# Patient Record
Sex: FEMALE | Race: ASIAN | NOT HISPANIC OR LATINO
[De-identification: names, ages, dates, MRNs, and addresses within clinical notes are randomized per-mention and may not be internally consistent; named-entity substitution may affect disease eponyms.]

---

## 2022-09-29 PROBLEM — Z00.00 ENCOUNTER FOR PREVENTIVE HEALTH EXAMINATION: Status: ACTIVE | Noted: 2022-09-29

## 2022-10-06 ENCOUNTER — APPOINTMENT (OUTPATIENT)
Dept: SURGERY | Facility: CLINIC | Age: 62
End: 2022-10-06

## 2022-10-06 VITALS
DIASTOLIC BLOOD PRESSURE: 79 MMHG | BODY MASS INDEX: 26.13 KG/M2 | WEIGHT: 142 LBS | SYSTOLIC BLOOD PRESSURE: 128 MMHG | HEIGHT: 62 IN | HEART RATE: 86 BPM

## 2022-10-06 DIAGNOSIS — Z86.39 PERSONAL HISTORY OF OTHER ENDOCRINE, NUTRITIONAL AND METABOLIC DISEASE: ICD-10-CM

## 2022-10-06 DIAGNOSIS — I25.2 OLD MYOCARDIAL INFARCTION: ICD-10-CM

## 2022-10-06 DIAGNOSIS — Z82.49 FAMILY HISTORY OF ISCHEMIC HEART DISEASE AND OTHER DISEASES OF THE CIRCULATORY SYSTEM: ICD-10-CM

## 2022-10-06 DIAGNOSIS — Z83.3 FAMILY HISTORY OF DIABETES MELLITUS: ICD-10-CM

## 2022-10-06 DIAGNOSIS — Z86.73 PERSONAL HISTORY OF TRANSIENT ISCHEMIC ATTACK (TIA), AND CEREBRAL INFARCTION W/OUT RESIDUAL DEFICITS: ICD-10-CM

## 2022-10-06 DIAGNOSIS — M51.26 OTHER INTERVERTEBRAL DISC DISPLACEMENT, LUMBAR REGION: ICD-10-CM

## 2022-10-06 DIAGNOSIS — G58.9 MONONEUROPATHY, UNSPECIFIED: ICD-10-CM

## 2022-10-06 DIAGNOSIS — Z95.0 PRESENCE OF CARDIAC PACEMAKER: ICD-10-CM

## 2022-10-06 DIAGNOSIS — Z86.79 PERSONAL HISTORY OF OTHER DISEASES OF THE CIRCULATORY SYSTEM: ICD-10-CM

## 2022-10-06 DIAGNOSIS — E11.9 TYPE 2 DIABETES MELLITUS W/OUT COMPLICATIONS: ICD-10-CM

## 2022-10-06 DIAGNOSIS — Z78.9 OTHER SPECIFIED HEALTH STATUS: ICD-10-CM

## 2022-10-06 DIAGNOSIS — Z87.2 PERSONAL HISTORY OF DISEASES OF THE SKIN AND SUBCUTANEOUS TISSUE: ICD-10-CM

## 2022-10-06 DIAGNOSIS — I10 ESSENTIAL (PRIMARY) HYPERTENSION: ICD-10-CM

## 2022-10-06 PROCEDURE — 99203 OFFICE O/P NEW LOW 30 MIN: CPT

## 2022-10-06 RX ORDER — EXENATIDE 2 MG/.65ML
INJECTION, SUSPENSION, EXTENDED RELEASE SUBCUTANEOUS
Refills: 0 | Status: ACTIVE | COMMUNITY

## 2022-10-06 RX ORDER — ACETAMINOPHEN 325 MG/1
325 TABLET, CHEWABLE ORAL
Refills: 0 | Status: ACTIVE | COMMUNITY

## 2022-10-06 RX ORDER — EZETIMIBE 10 MG/1
10 TABLET ORAL
Refills: 0 | Status: ACTIVE | COMMUNITY

## 2022-10-06 RX ORDER — EVOLOCUMAB 140 MG/ML
INJECTION, SOLUTION SUBCUTANEOUS
Refills: 0 | Status: ACTIVE | COMMUNITY

## 2022-10-06 RX ORDER — RANOLAZINE 500 MG/1
500 TABLET, EXTENDED RELEASE ORAL
Refills: 0 | Status: ACTIVE | COMMUNITY

## 2022-10-06 RX ORDER — ERGOCALCIFEROL (VITAMIN D2) 1250 MCG
50000 CAPSULE ORAL
Refills: 0 | Status: ACTIVE | COMMUNITY

## 2022-10-06 RX ORDER — AMLODIPINE BESYLATE 2.5 MG/1
2.5 TABLET ORAL
Refills: 0 | Status: ACTIVE | COMMUNITY

## 2022-10-06 RX ORDER — CARVEDILOL 3.12 MG/1
3.12 TABLET, FILM COATED ORAL
Refills: 0 | Status: ACTIVE | COMMUNITY

## 2022-10-06 RX ORDER — TICAGRELOR 90 MG/1
90 TABLET ORAL
Refills: 0 | Status: ACTIVE | COMMUNITY

## 2022-10-06 RX ORDER — INSULIN GLARGINE 100 [IU]/ML
INJECTION, SOLUTION SUBCUTANEOUS
Refills: 0 | Status: ACTIVE | COMMUNITY

## 2022-10-06 RX ORDER — ISOSORBIDE MONONITRATE 60 MG/1
60 TABLET, EXTENDED RELEASE ORAL
Refills: 0 | Status: ACTIVE | COMMUNITY

## 2022-10-06 RX ORDER — DICYCLOMINE HYDROCHLORIDE 10 MG/1
10 CAPSULE ORAL
Refills: 0 | Status: ACTIVE | COMMUNITY

## 2022-10-06 RX ORDER — FAMOTIDINE 40 MG/1
40 TABLET, FILM COATED ORAL
Refills: 0 | Status: ACTIVE | COMMUNITY

## 2022-10-06 RX ORDER — GABAPENTIN 300 MG/1
300 CAPSULE ORAL
Refills: 0 | Status: ACTIVE | COMMUNITY

## 2022-10-06 NOTE — PHYSICAL EXAM
[Alert] : alert [Oriented to Person] : oriented to person [Oriented to Place] : oriented to place [Oriented to Time] : oriented to time [Calm] : calm [de-identified] : She  is alert, well-groomed, and in NAD [de-identified] : anicteric.  Nasal mucosa pink, septum midline. Oral mucosa pink.  Tongue midline, Pharynx without exudates. [de-identified] : Neck supple. Trachea midline. Thyroid isthmus barely palpable, lobes not felt. [de-identified] : right posterior thigh mass is  fixed , Firm,  non-tender,   Well defined. deep. No palpable lymph nodes.   Mass size -  3 cm x  2  cm. \par left posterior thigh mass is fixed Firm, non-tender,   Well defined.  deep. No palpable lymph nodes.   Mass size - 2 cm x  2 cm.

## 2022-10-06 NOTE — HISTORY OF PRESENT ILLNESS
[de-identified] : This is a 62 year  old patient who was referred by Dr. Tony Yarbrough with the chief complaint of having BL posterior thigh and BL arm masses.  She reports having BL thigh masses  for 2  months and BL arm masses for many years. She denies any trauma to the area.   She denies any fever or  night sweats. Appetite is good and weight is stable.  She states that recently the masses  on BL posterior thighs  started to  get  bigger and  more symptomatic. She wants to know if it could  be surgically  removed. Ms. HOFFMAN  is s/p AICD and multiple cardiac stents. last stent in 2018\par

## 2022-10-06 NOTE — CONSULT LETTER
[Dear  ___] : Dear  [unfilled], [Consult Letter:] : I had the pleasure of evaluating your patient, [unfilled]. [Please see my note below.] : Please see my note below. [Consult Closing:] : Thank you very much for allowing me to participate in the care of this patient.  If you have any questions, please do not hesitate to contact me. [Sincerely,] : Sincerely, [FreeTextEntry3] : Ten Lopez MD, FACS

## 2022-10-06 NOTE — PLAN
[FreeTextEntry1] : Ms. HOFFMAN  was told significance of findings, options, risks and benefits were explained.  Informed consent for excision  BL posterior thigh masses         and potential risks, benefits and alternatives (surgical options were discussed including non-surgical options or the option of no surgery) to the planned surgery were discussed in depth.  All surgical options were discussed including non-surgical treatments.  She wishes to proceed with surgery.  We will plan for surgery on at the next available date, pending any required insurance pre-certification or pre-approval. She agrees to obtain any necessary pre-operative evaluations and testing prior to surgery.\par Patient advised to seek immediate medical attention with any acute change in symptoms or with the development of any new or worsening symptoms.  Patient's questions and concerns addressed to patient's satisfaction, and patient verbalized an understanding of the information discussed.\par \par

## 2022-11-11 ENCOUNTER — OUTPATIENT (OUTPATIENT)
Dept: OUTPATIENT SERVICES | Facility: HOSPITAL | Age: 62
LOS: 1 days | End: 2022-11-11
Payer: MEDICAID

## 2022-11-11 VITALS
TEMPERATURE: 98 F | HEART RATE: 83 BPM | DIASTOLIC BLOOD PRESSURE: 61 MMHG | OXYGEN SATURATION: 100 % | SYSTOLIC BLOOD PRESSURE: 108 MMHG | WEIGHT: 141.98 LBS | HEIGHT: 62 IN | RESPIRATION RATE: 16 BRPM

## 2022-11-11 DIAGNOSIS — M79.89 OTHER SPECIFIED SOFT TISSUE DISORDERS: ICD-10-CM

## 2022-11-11 DIAGNOSIS — E78.5 HYPERLIPIDEMIA, UNSPECIFIED: ICD-10-CM

## 2022-11-11 DIAGNOSIS — Z01.818 ENCOUNTER FOR OTHER PREPROCEDURAL EXAMINATION: ICD-10-CM

## 2022-11-11 DIAGNOSIS — E11.9 TYPE 2 DIABETES MELLITUS WITHOUT COMPLICATIONS: ICD-10-CM

## 2022-11-11 DIAGNOSIS — Z95.810 PRESENCE OF AUTOMATIC (IMPLANTABLE) CARDIAC DEFIBRILLATOR: ICD-10-CM

## 2022-11-11 DIAGNOSIS — Z95.5 PRESENCE OF CORONARY ANGIOPLASTY IMPLANT AND GRAFT: Chronic | ICD-10-CM

## 2022-11-11 DIAGNOSIS — Z95.0 PRESENCE OF CARDIAC PACEMAKER: Chronic | ICD-10-CM

## 2022-11-11 DIAGNOSIS — I10 ESSENTIAL (PRIMARY) HYPERTENSION: ICD-10-CM

## 2022-11-11 DIAGNOSIS — Z95.5 PRESENCE OF CORONARY ANGIOPLASTY IMPLANT AND GRAFT: ICD-10-CM

## 2022-11-11 PROCEDURE — G0463: CPT

## 2022-11-11 NOTE — H&P PST ADULT - NSICDXPASTMEDICALHX_GEN_ALL_CORE_FT
PAST MEDICAL HISTORY:  Benign essential HTN     HLD (hyperlipidemia)     MI, old     Stage 3 chronic kidney disease     Type 2 diabetes mellitus      PAST MEDICAL HISTORY:  Benign essential HTN     HLD (hyperlipidemia)     ICD (implantable cardioverter-defibrillator) in place 2013    MI, old     Stage 3 chronic kidney disease     Type 2 diabetes mellitus

## 2022-11-11 NOTE — H&P PST ADULT - CARDIOVASCULAR COMMENTS
h/o old MI (2011 - on carvedilol, s/p pacemaker, ICD (2013) CAD s/p PCI (13 stents) - brilinta, HTN - amlodipine, carvedilol, isosorbide

## 2022-11-11 NOTE — H&P PST ADULT - HISTORY OF PRESENT ILLNESS
63 y/o female with PMH of HTN, psoriasis, HLD, T2DM with nephropathy (Cr 1.92, eGFR 27), CAD with old MI (2011), s/p pacemaker/ICD insertion (10/2013), s/p PCI x 13, last cardiac stent reportedly placed 2019 ([facility]), is diagnosed with other specified soft tissue disorders. She is scheduled for excision of bilateral posterior thigh mass on 11/16/2022    Patient is followed by Cardiologist, Dr. Rivera [phone] 63 y/o female with PMH of HTN, psoriasis, HLD, T2DM with nephropathy (Cr 1.92, eGFR 27), CAD with old MI (2011), s/p ICD insertion (10/2013), s/p PCI x 13, last cardiac stent reportedly placed 2019 ([facility]), is diagnosed with other specified soft tissue disorders. She is scheduled for excision of bilateral posterior thigh mass on 11/16/2022    Patient is followed by Cardiologist, Dr. Rivera [phone]

## 2022-11-11 NOTE — H&P PST ADULT - RESPIRATORY
normal/clear to auscultation bilaterally/no wheezes/no rales/no rhonchi/no respiratory distress/no use of accessory muscles/no subcutaneous emphysema/airway patent/breath sounds equal/good air movement/respirations non-labored/no intercostal retractions/no dull ness or hyperresonance to percussion

## 2022-11-11 NOTE — H&P PST ADULT - NSICDXFAMILYHX_GEN_ALL_CORE_FT
FAMILY HISTORY:  Mother  Still living? Yes, Estimated age: Age Unknown  FH: type 2 diabetes, Age at diagnosis: Age Unknown    Sibling  Still living? Unknown  FH: type 2 diabetes, Age at diagnosis: Age Unknown

## 2022-11-11 NOTE — H&P PST ADULT - PROBLEM SELECTOR PLAN 1
Last interrogation 11/9/2022  Dr. Rivera [phone]  Report is pending in PST Last interrogation 11/9/2022  Dr. Rivera [phone]  Report with battery life, sensing/pacing pending in PST

## 2022-11-11 NOTE — H&P PST ADULT - PROBLEM SELECTOR PLAN 3
Take antihypertensive medications with a sip of water the morning of surgery  (carvedilol, amlodipine, isosorbide. ranoxaline)

## 2022-11-11 NOTE — H&P PST ADULT - PROBLEM SELECTOR PLAN 6
Scheduled for excision of bilateral posterior thigh mass on 11/16/2022  Preoperative instructions discussed and given to patient.   Instructed to schedule COVID 19 test 3 days prior to surgery.   Discussed preprocedure skin preparation using  chlorhexidine gluconate 4% solution three days prior to  surgery.  Instructed patient to avoid aspirin and aspirin products, over the counter medications such as vitamins and herbal medications, one week prior to surgery.  Take Tylenol as needed for pain  Patient verbalized understanding of instructions

## 2022-11-11 NOTE — H&P PST ADULT - MUSCULOSKELETAL
negative right heel spur/ROM intact/decreased ROM due to pain/strength 5/5 bilateral upper extremities

## 2022-11-11 NOTE — H&P PST ADULT - PROBLEM SELECTOR PLAN 5
Case discussed with Dr. Hines  Patient is to start baby aspirin and take medication the morning of surgery with a sip of water  Hold Brilinta per recommendation from Cardiologist  EKG on chart (pacemaker)  Echo pending in PST  Cardiac optimization pending in PST

## 2022-11-11 NOTE — H&P PST ADULT - NSICDXPASTSURGICALHX_GEN_ALL_CORE_FT
PAST SURGICAL HISTORY:  Cardiac pacemaker in situ     Stented coronary artery x 13 since 2011, last stent placed 2019

## 2022-11-11 NOTE — H&P PST ADULT - CARDIOVASCULAR SYMPTOMS
last episode of CP 2 weeks ago (on ranexa)/chest pain/dyspnea on exertion last episode of CP 2 weeks ago (on ranexa), [place]/chest pain/dyspnea on exertion

## 2022-11-11 NOTE — H&P PST ADULT - ASSESSMENT
63 y/o female with PMH of HTN, psoriasis, HLD, T2DM with nephropathy (Cr 1.92, eGFR 27), CAD with old MI (2011), s/p pacemaker/ICD insertion (10/2013), s/p PCI x 13, last cardiac stent reportedly placed 2019 ([facility]), is diagnosed with other specified soft tissue disorders  Patient is followed by Cardiologist, Dr. Rivera [phone]    STOP BANG Score is 2 63 y/o female with PMH of HTN, HLD, T2DM with nephropathy (Cr 1.92, eGFR 27), CAD with old MI (2011), s/p pacemaker/ICD insertion (10/2013), s/p PCI x 13, last cardiac stent reportedly placed 2019 (), is diagnosed with other specified soft tissue disorders  Patient is followed by Cardiologist, Dr. Rivera [phone]    STOP BANG Score is 2

## 2022-11-11 NOTE — H&P PST ADULT - NSANTHOSAYNRD_GEN_A_CORE
No. URIEL screening performed.  STOP BANG Legend: 0-2 = LOW Risk; 3-4 = INTERMEDIATE Risk; 5-8 = HIGH Risk

## 2022-11-15 ENCOUNTER — TRANSCRIPTION ENCOUNTER (OUTPATIENT)
Age: 62
End: 2022-11-15

## 2022-11-16 ENCOUNTER — TRANSCRIPTION ENCOUNTER (OUTPATIENT)
Age: 62
End: 2022-11-16

## 2022-11-16 ENCOUNTER — APPOINTMENT (OUTPATIENT)
Dept: SURGERY | Facility: HOSPITAL | Age: 62
End: 2022-11-16

## 2022-11-16 ENCOUNTER — OUTPATIENT (OUTPATIENT)
Dept: OUTPATIENT SERVICES | Facility: HOSPITAL | Age: 62
LOS: 1 days | End: 2022-11-16
Payer: MEDICAID

## 2022-11-16 ENCOUNTER — RESULT REVIEW (OUTPATIENT)
Age: 62
End: 2022-11-16

## 2022-11-16 VITALS
OXYGEN SATURATION: 99 % | DIASTOLIC BLOOD PRESSURE: 70 MMHG | TEMPERATURE: 98 F | SYSTOLIC BLOOD PRESSURE: 115 MMHG | WEIGHT: 141.98 LBS | RESPIRATION RATE: 16 BRPM | HEIGHT: 62 IN | HEART RATE: 82 BPM

## 2022-11-16 VITALS
TEMPERATURE: 97 F | RESPIRATION RATE: 14 BRPM | OXYGEN SATURATION: 100 % | SYSTOLIC BLOOD PRESSURE: 145 MMHG | HEART RATE: 77 BPM | DIASTOLIC BLOOD PRESSURE: 75 MMHG

## 2022-11-16 DIAGNOSIS — Z95.0 PRESENCE OF CARDIAC PACEMAKER: Chronic | ICD-10-CM

## 2022-11-16 DIAGNOSIS — Z95.5 PRESENCE OF CORONARY ANGIOPLASTY IMPLANT AND GRAFT: Chronic | ICD-10-CM

## 2022-11-16 DIAGNOSIS — M79.89 OTHER SPECIFIED SOFT TISSUE DISORDERS: ICD-10-CM

## 2022-11-16 LAB
GLUCOSE BLDC GLUCOMTR-MCNC: 105 MG/DL — HIGH (ref 70–99)
GLUCOSE BLDC GLUCOMTR-MCNC: 97 MG/DL — SIGNIFICANT CHANGE UP (ref 70–99)

## 2022-11-16 PROCEDURE — 88305 TISSUE EXAM BY PATHOLOGIST: CPT | Mod: 26

## 2022-11-16 PROCEDURE — 27327 EXC THIGH/KNEE LES SC < 3 CM: CPT

## 2022-11-16 PROCEDURE — 82962 GLUCOSE BLOOD TEST: CPT

## 2022-11-16 PROCEDURE — 88305 TISSUE EXAM BY PATHOLOGIST: CPT

## 2022-11-16 PROCEDURE — 27328 EXC THIGH/KNEE TUM DEEP <5CM: CPT | Mod: 50

## 2022-11-16 RX ORDER — EVOLOCUMAB 140 MG/ML
1 INJECTION, SOLUTION SUBCUTANEOUS
Qty: 0 | Refills: 0 | DISCHARGE

## 2022-11-16 RX ORDER — ENOXAPARIN SODIUM 100 MG/ML
46 INJECTION SUBCUTANEOUS
Qty: 0 | Refills: 0 | DISCHARGE

## 2022-11-16 RX ORDER — RANOLAZINE 500 MG/1
1 TABLET, FILM COATED, EXTENDED RELEASE ORAL
Qty: 0 | Refills: 0 | DISCHARGE

## 2022-11-16 RX ORDER — TICAGRELOR 90 MG/1
1 TABLET ORAL
Qty: 0 | Refills: 0 | DISCHARGE

## 2022-11-16 RX ORDER — EXENATIDE 250 UG/ML
0 INJECTION SUBCUTANEOUS
Qty: 0 | Refills: 0 | DISCHARGE

## 2022-11-16 RX ORDER — ACETAMINOPHEN 500 MG
1000 TABLET ORAL ONCE
Refills: 0 | Status: DISCONTINUED | OUTPATIENT
Start: 2022-11-16 | End: 2022-11-16

## 2022-11-16 RX ORDER — SODIUM CHLORIDE 9 MG/ML
3 INJECTION INTRAMUSCULAR; INTRAVENOUS; SUBCUTANEOUS EVERY 8 HOURS
Refills: 0 | Status: DISCONTINUED | OUTPATIENT
Start: 2022-11-16 | End: 2022-11-16

## 2022-11-16 RX ORDER — FENTANYL CITRATE 50 UG/ML
25 INJECTION INTRAVENOUS
Refills: 0 | Status: DISCONTINUED | OUTPATIENT
Start: 2022-11-16 | End: 2022-11-16

## 2022-11-16 RX ORDER — ISOSORBIDE MONONITRATE 60 MG/1
1 TABLET, EXTENDED RELEASE ORAL
Qty: 0 | Refills: 0 | DISCHARGE

## 2022-11-16 RX ORDER — CHOLECALCIFEROL (VITAMIN D3) 125 MCG
1 CAPSULE ORAL
Qty: 0 | Refills: 0 | DISCHARGE

## 2022-11-16 RX ORDER — FAMOTIDINE 10 MG/ML
1 INJECTION INTRAVENOUS
Qty: 0 | Refills: 0 | DISCHARGE

## 2022-11-16 RX ORDER — GABAPENTIN 400 MG/1
1 CAPSULE ORAL
Qty: 0 | Refills: 0 | DISCHARGE

## 2022-11-16 RX ORDER — AMLODIPINE BESYLATE 2.5 MG/1
1 TABLET ORAL
Qty: 0 | Refills: 0 | DISCHARGE

## 2022-11-16 RX ORDER — CARVEDILOL PHOSPHATE 80 MG/1
1 CAPSULE, EXTENDED RELEASE ORAL
Qty: 0 | Refills: 0 | DISCHARGE

## 2022-11-16 RX ORDER — EZETIMIBE 10 MG/1
1 TABLET ORAL
Qty: 0 | Refills: 0 | DISCHARGE

## 2022-11-16 RX ORDER — ACETAMINOPHEN 500 MG
2 TABLET ORAL
Qty: 0 | Refills: 0 | DISCHARGE

## 2022-11-16 RX ORDER — LOSARTAN POTASSIUM 100 MG/1
1 TABLET, FILM COATED ORAL
Qty: 0 | Refills: 0 | DISCHARGE

## 2022-11-16 NOTE — ASU PATIENT PROFILE, ADULT - HEALTH/HEALTHCARE ANXIETIES, PROFILE
CM Discharge Planning    Patient is off the floor for procedure, will assess discharge needs in AM.       SW/CM to remain available for support and/or discharge planning.          Allan Winston RN Case Manager   Ext. [phone] none

## 2022-11-16 NOTE — ASU PATIENT PROFILE, ADULT - NSICDXPASTMEDICALHX_GEN_ALL_CORE_FT
PAST MEDICAL HISTORY:  Benign essential HTN     HLD (hyperlipidemia)     ICD (implantable cardioverter-defibrillator) in place 2013    MI, old     Stage 3 chronic kidney disease     Type 2 diabetes mellitus

## 2022-11-16 NOTE — BRIEF OPERATIVE NOTE - NSICDXBRIEFPROCEDURE_GEN_ALL_CORE_FT
PROCEDURES:  Excision, subcutaneous tumor, thigh area, 3 cm or greater 16-Nov-2022 09:34:39 BILATERAL Marlen Streeter

## 2022-11-16 NOTE — ASU DISCHARGE PLAN (ADULT/PEDIATRIC) - CARE PROVIDER_API CALL
Ten Lopez)  Surgery  [address] [facility], [place] [address]  Phone: [phone]  Fax: [phone]  Follow Up Time:    Ten Lopez)  Surgery  [address] [facility], [place] [address]  Phone: [phone]  Fax: [phone]  Follow Up Time: 2 weeks

## 2022-11-16 NOTE — ASU DISCHARGE PLAN (ADULT/PEDIATRIC) - ASU DC SPECIAL INSTRUCTIONSFT
Please follow-up with your surgeon in 1-2 weeks. Drink plenty of fluids and rest as needed. Call for any fever over 101, nausea, vomiting, severe pain, or bleeding.    SURGICAL SITES : In 48 hours you may remove your outer dressing and keep white steri-strips in place allowing them to fall off on their own. You may shower 48 hours post-operatively but do not bathe or soak in the water for 1-2 weeks; pat dry. If you notice any signs of surgical site infection (ie. redness, swelling, pain, pus drainage), please seek medical care immediately.       PAIN CONTROL: You may take Motrin 600mg-800mg (with food) every 6 hours or Tylenol 650mg-1000mg every 6 hours as needed for mild pain. Stagger one medication 3 hours after the other for maximum pain control. Maximum daily dose of Tylenol should not exceed 4000mg/day. Please follow-up with your surgeon in 2 weeks. Drink plenty of fluids and rest as needed. Call for any fever over 101, nausea, vomiting, severe pain, or bleeding.    SURGICAL SITES : In 48 hours you may remove your outer dressing and keep white steri-strips in place allowing them to fall off on their own. You may shower 48 hours post-operatively but do not bathe or soak in the water for 2 weeks; pat dry. If you notice any signs of surgical site infection (ie. redness, swelling, pain, pus drainage), please seek medical care immediately.       PAIN CONTROL: You may take Motrin 600mg-800mg (with food) every 6 hours or Tylenol 650mg-1000mg every 6 hours as needed for mild pain. Stagger one medication 3 hours after the other for maximum pain control. Maximum daily dose of Tylenol should not exceed 4000mg/day.

## 2022-11-17 PROBLEM — I25.2 OLD MYOCARDIAL INFARCTION: Chronic | Status: ACTIVE | Noted: 2022-11-11

## 2022-11-17 PROBLEM — E11.9 TYPE 2 DIABETES MELLITUS WITHOUT COMPLICATIONS: Chronic | Status: ACTIVE | Noted: 2022-11-11

## 2022-11-17 PROBLEM — N18.30 CHRONIC KIDNEY DISEASE, STAGE 3 UNSPECIFIED: Chronic | Status: ACTIVE | Noted: 2022-11-11

## 2022-11-17 PROBLEM — Z95.810 PRESENCE OF AUTOMATIC (IMPLANTABLE) CARDIAC DEFIBRILLATOR: Chronic | Status: ACTIVE | Noted: 2022-11-11

## 2022-11-17 PROBLEM — E78.5 HYPERLIPIDEMIA, UNSPECIFIED: Chronic | Status: ACTIVE | Noted: 2022-11-11

## 2022-11-17 PROBLEM — I10 ESSENTIAL (PRIMARY) HYPERTENSION: Chronic | Status: ACTIVE | Noted: 2022-11-11

## 2022-11-22 LAB — SURGICAL PATHOLOGY STUDY: SIGNIFICANT CHANGE UP

## 2022-11-29 PROBLEM — M79.89 SOFT TISSUE MASS: Status: ACTIVE | Noted: 2022-10-06

## 2022-11-29 NOTE — REASON FOR VISIT
Please call pt regarding device monitor alert patient received last night. Patient stated he has an appointment at 3pm can leave a voicemail as well. Please call to discuss.    [Post Op: _________] : a [unfilled] post op visit

## 2022-12-05 ENCOUNTER — APPOINTMENT (OUTPATIENT)
Dept: SURGERY | Facility: CLINIC | Age: 62
End: 2022-12-05

## 2022-12-05 VITALS — TEMPERATURE: 97.1 F

## 2022-12-05 DIAGNOSIS — M79.89 OTHER SPECIFIED SOFT TISSUE DISORDERS: ICD-10-CM

## 2022-12-05 PROCEDURE — 99024 POSTOP FOLLOW-UP VISIT: CPT

## 2022-12-05 NOTE — PLAN
[FreeTextEntry1] : Ms. HOFFMAN will follow up  if needed. Warning signs, follow up, and restrictions were discussed with the patient.

## 2022-12-05 NOTE — HISTORY OF PRESENT ILLNESS
[de-identified] : Ms. HOFFMAN  is s/p excision Bilateral posterior thigh masses on 11/16/2022. Patient's pathology results were  consistent with lipomas. Today Ms. HOFFMAN offers no complaints. patient reports no fever, chills,  or  pain. Her  surgical wounds are  healing well. No signs of inflammation, infection or exudate. Patient reports good bowel movements and appetite.

## 2022-12-05 NOTE — PHYSICAL EXAM
[Alert] : alert [Oriented to Person] : oriented to person [Oriented to Place] : oriented to place [Oriented to Time] : oriented to time [Calm] : calm [de-identified] : She  is alert, well-groomed, and in NAD [de-identified] : anicteric.  Nasal mucosa pink, septum midline. Oral mucosa pink.  Tongue midline, Pharynx without exudates. [de-identified] : Neck supple. Trachea midline. Thyroid isthmus barely palpable, lobes not felt. [de-identified] : BL posterior thigh        Surgical wounds are  healing well.   no signs of  inflammation or infection.

## 2022-12-05 NOTE — DATA REVIEWED
[FreeTextEntry1] : Terrell Accession Number : [id number]\par Patient:   MILAGROS HOFFMAN\par \par \par Accession:                             [id number]\par \par Collected Date/Time:                   11/16/2022 09:18 EST\par Received Date/Time:                    11/17/2022 10:37 EST\par \par Surgical Pathology Report - Auth (Verified)\par \par Specimen(s) Submitted\par 1  Left thigh mass\par 2  Right posterior thigh mass\par \par Final Diagnosis\par 1. Thigh, left, excision of mass:\par - Lipoma\par \par \par 2. Thigh, right, posterior , excision of mass:\par - Lipoma\par Verified by: Ayla Banerjee M.D.\par (Electronic Signature)\par Reported on: 11/22/22 13:54 EST, [facility], [id number]\par [address], [place] [address]\par _________________________________________________________________\par \par \par Clinical Information\par Bilateral posterior thigh masses

## 2023-07-14 NOTE — ASU DISCHARGE PLAN (ADULT/PEDIATRIC) - PAIN MANAGEMENT
leg doppler was negative for blood clot  Leg elevated while at rest, warm compress locally   Continue Keflex 500mg three times daily for total 14 days  in addition Bactrim DS twice daily for 10 days  Follow with the ID referral Take over the counter pain medication

## 2023-08-16 LAB — SARS-COV-2 N GENE NPH QL NAA+PROBE: NOT DETECTED

## (undated) DEVICE — FOR-ESU VALLEYLAB T7E14999DX: Type: DURABLE MEDICAL EQUIPMENT

## (undated) DEVICE — DRSG CURITY GAUZE SPONGE 4 X 4" 12-PLY

## (undated) DEVICE — ELCTR GROUNDING PAD ADULT COVIDIEN

## (undated) DEVICE — SUT POLYSORB 2-0 18" TIES UNDYED

## (undated) DEVICE — DRAPE LAPAROTOMY W POUCHES

## (undated) DEVICE — SUT POLYSORB 3-0 30" V-20

## (undated) DEVICE — VENODYNE/SCD SLEEVE CALF MEDIUM

## (undated) DEVICE — DRSG MASTISOL

## (undated) DEVICE — DRSG TEGADERM 4X4.75"

## (undated) DEVICE — GLV 7 PROTEXIS (WHITE)

## (undated) DEVICE — DRAPE 1/2 SHEET 40X57"

## (undated) DEVICE — NDL HYPO SAFE 25G X 1.5" (ORANGE)

## (undated) DEVICE — PACK MINOR NO DRAPE

## (undated) DEVICE — WARMING BLANKET UPPER ADULT

## (undated) DEVICE — SOL IRR POUR NS 0.9% 1500ML

## (undated) DEVICE — GLV 7.5 PROTEXIS (WHITE)

## (undated) DEVICE — SUT POLYSORB 4-0 27" P-12 UNDYED

## (undated) DEVICE — DRAPE LIGHT HANDLE COVER (BLUE)